# Patient Record
(demographics unavailable — no encounter records)

---

## 2024-10-07 NOTE — DISCUSSION/SUMMARY
[FreeTextEntry1] : 16 year old F with symptoms likely 2/2 viral URI, however exposed to pneumonia/bronchitis in household. PE and vitals are wnl. Rapid Strep negative.   Recommend supportive care including antipyretics, fluids, and humidifier/warm bath, then nasal saline followed by nasal suction. Education provided for signs of respiratory distress and dehydration. Return if symptoms worsen or persist. F/u throat cx Azithromycin x5 days due to exposure

## 2024-10-07 NOTE — HISTORY OF PRESENT ILLNESS
[de-identified] : FEVER, SORE THROAT, COUGH, AND WEAKNESS  [FreeTextEntry6] : 16 yr old F presenting for 4 days of symptoms. Started with a sharp pain in right side of throat. Now with sore throat b/l, pain behind nose, occasional dry cough. No fever, congestion. Hoarse voice, no appetite, feels weak/achy, and more tired than normal. Whole family sick and treated with abx.

## 2024-10-07 NOTE — HISTORY OF PRESENT ILLNESS
[de-identified] : FEVER, SORE THROAT, COUGH, AND WEAKNESS  [FreeTextEntry6] : 16 yr old F presenting for 4 days of symptoms. Started with a sharp pain in right side of throat. Now with sore throat b/l, pain behind nose, occasional dry cough. No fever, congestion. Hoarse voice, no appetite, feels weak/achy, and more tired than normal. Whole family sick and treated with abx.

## 2024-10-07 NOTE — PHYSICAL EXAM
[Cerumen in canal] : cerumen in canal [Bilateral] : (bilateral) [Erythematous Oropharynx] : erythematous oropharynx [Enlarged Tonsils] : enlarged tonsils [NL] : warm, clear [Tired appearing] : not tired appearing [Lethargic] : not lethargic [Vesicles] : no vesicles [Exudate] : no exudate [Wheezing] : no wheezing [Crackles] : no crackles [Transmitted Upper Airway Sounds] : no transmitted upper airway sounds [Tachypnea] : no tachypnea

## 2024-10-16 NOTE — BEGINNING OF VISIT
[Mother] : mother
Attending Attestation (For Attendings USE Only)...

## 2024-10-17 NOTE — REVIEW OF SYSTEMS
[Fever] : no fever [Headache] : headache [Nasal Discharge] : nasal discharge [Nasal Congestion] : nasal congestion [Sore Throat] : sore throat [Tachypnea] : not tachypneic [Wheezing] : no wheezing [Cough] : cough

## 2024-10-17 NOTE — DISCUSSION/SUMMARY
[FreeTextEntry1] :  16 year girl with URI symptoms, likely secondary to viral illness.  Recommend supportive care including antipyretics, fluids, and nasal saline.  Return if symptoms worsen, develop signs of respiratory distress or dehydration

## 2024-10-17 NOTE — HISTORY OF PRESENT ILLNESS
[FreeTextEntry6] :  15 yo female with cough and congestion x 2 days. no fevers. No shortness of breath or diff breathing.  Seen in office 2 weeks ago, diagnosed with atypical pneumonia. s/p Abx. Improved before new symptoms started.  [de-identified] : STOMACH PAIN FROM COUGHING, CHEST PAIN,

## 2024-10-17 NOTE — HISTORY OF PRESENT ILLNESS
[FreeTextEntry6] :  15 yo female with cough and congestion x 2 days. no fevers. No shortness of breath or diff breathing.  Seen in office 2 weeks ago, diagnosed with atypical pneumonia. s/p Abx. Improved before new symptoms started.  [de-identified] : STOMACH PAIN FROM COUGHING, CHEST PAIN,

## 2024-10-17 NOTE — PHYSICAL EXAM
[Acute Distress] : no acute distress [Alert] : alert [EOMI] : grossly EOMI [Conjuctival Injection] : no conjunctival injection [Eyelid Swelling] : no eyelid swelling [Clear] : right tympanic membrane clear [Clear Rhinorrhea] : clear rhinorrhea [Inflamed Nasal Mucosa] : inflamed nasal mucosa [Supple] : supple [NL] : regular rate and rhythm, normal S1, S2 audible, no murmurs [Soft] : soft [Capillary Refill <2s] : capillary refill < 2s [de-identified] : MMM [FreeTextEntry7] : Equal air entry, clear lung sounds b/l, no wheezing, crackles or Tachypnea

## 2024-10-17 NOTE — PHYSICAL EXAM
[Acute Distress] : no acute distress [Alert] : alert [EOMI] : grossly EOMI [Conjuctival Injection] : no conjunctival injection [Eyelid Swelling] : no eyelid swelling [Clear] : right tympanic membrane clear [Clear Rhinorrhea] : clear rhinorrhea [Inflamed Nasal Mucosa] : inflamed nasal mucosa [Supple] : supple [NL] : regular rate and rhythm, normal S1, S2 audible, no murmurs [Soft] : soft [Capillary Refill <2s] : capillary refill < 2s [de-identified] : MMM [FreeTextEntry7] : Equal air entry, clear lung sounds b/l, no wheezing, crackles or Tachypnea

## 2024-10-17 NOTE — HISTORY OF PRESENT ILLNESS
[FreeTextEntry6] :  17 yo female with cough and congestion x 2 days. no fevers. No shortness of breath or diff breathing.  Seen in office 2 weeks ago, diagnosed with atypical pneumonia. s/p Abx. Improved before new symptoms started.  [de-identified] : STOMACH PAIN FROM COUGHING, CHEST PAIN,

## 2024-10-17 NOTE — PHYSICAL EXAM
[Acute Distress] : no acute distress [Alert] : alert [EOMI] : grossly EOMI [Conjuctival Injection] : no conjunctival injection [Eyelid Swelling] : no eyelid swelling [Clear] : right tympanic membrane clear [Clear Rhinorrhea] : clear rhinorrhea [Inflamed Nasal Mucosa] : inflamed nasal mucosa [Supple] : supple [NL] : regular rate and rhythm, normal S1, S2 audible, no murmurs [Soft] : soft [Capillary Refill <2s] : capillary refill < 2s [de-identified] : MMM [FreeTextEntry7] : Equal air entry, clear lung sounds b/l, no wheezing, crackles or Tachypnea

## 2024-12-12 NOTE — PHYSICAL EXAM
[Cerumen in canal] : cerumen in canal [Bilateral] : (bilateral) [Inflamed Nasal Mucosa] : inflamed nasal mucosa [Hypertrophied Nasal Mucosa] : hypertrophied nasal mucosa [NL] : warm, clear

## 2025-01-10 NOTE — DISCUSSION/SUMMARY
[FreeTextEntry1] : CONSITENT WITH MUSCLE STRAIN, TO APPLY WARM COMPRESS, PASSIVE RANGE OF MOTION EXERCISES, ACETAMINOPHEN, CHANGE POSTURAL HABITS, CALL IF NO IMPROVEMENT IN 5 DAYS

## 2025-01-10 NOTE — PHYSICAL EXAM
[Straight] : straight [NL] : warm, clear [de-identified] : TENDERNESS OVER LATISSUMUS DORSI BILATERALY [de-identified] : NO POINT TENDERNESS [de-identified] : CN 2-12 GROSSLY NORMAL, NORMAL STREGNTH AND TONE ALL EXTREMIRIES, NORMAL REFLEXES, NEGATIVE ROMBERG

## 2025-01-10 NOTE — PHYSICAL EXAM
[Straight] : straight [NL] : warm, clear [de-identified] : TENDERNESS OVER LATISSUMUS DORSI BILATERALY [de-identified] : NO POINT TENDERNESS [de-identified] : CN 2-12 GROSSLY NORMAL, NORMAL STREGNTH AND TONE ALL EXTREMIRIES, NORMAL REFLEXES, NEGATIVE ROMBERG

## 2025-01-10 NOTE — HISTORY OF PRESENT ILLNESS
[de-identified] : BACK PAIN  4  DAY HX, NO FEVER, NO COUGH, NO TRAUMA, NO RECENT PHYSICAL ACTIVITY,, BU HUNCHES OVER LARGE AMOUNT OF HOMEWORK. NO C/O OF WEAKNESS OR NUMBNESS OF LOWER EXTREMITIES, NO URINARY OR STOOL INCONTINENCE

## 2025-01-10 NOTE — HISTORY OF PRESENT ILLNESS
[de-identified] : BACK PAIN  4  DAY HX, NO FEVER, NO COUGH, NO TRAUMA, NO RECENT PHYSICAL ACTIVITY,, BU HUNCHES OVER LARGE AMOUNT OF HOMEWORK. NO C/O OF WEAKNESS OR NUMBNESS OF LOWER EXTREMITIES, NO URINARY OR STOOL INCONTINENCE

## 2025-01-13 NOTE — PHYSICAL EXAM
[Normal] : mucosa is normal [Midline] : trachea located in midline position [de-identified] : Bilateral excessive wax  [de-identified] : 2-3+

## 2025-01-13 NOTE — ASSESSMENT
[FreeTextEntry1] : 17y/o female who was referred by her pcp for excessive ear wax bilateral. She also complains of swollen tonsils and snoring   Excessive wax bilateral -Wax removed from bilateral ear canals -Hearing is good -declines audio   Snoring Throat exam: 2-3+ tonsils  -Sleep study ordered  - if negative no need for intervention, if positive will plan for T&A

## 2025-01-13 NOTE — HISTORY OF PRESENT ILLNESS
[de-identified] : 17y/o female who came in to have her ears cleaned. She states her PCP said she has wax build up in both ears.  Pt denies any ear pain, drainage, tinnitus or hearing loss. She states her tonsil are swollen and irritated but she does not get strep infections. She states she does snore. She has no throat discomfort today.  Pt denies any throat pain, change in voice, dysphagia to solids or liquids or globus sensation

## 2025-01-13 NOTE — END OF VISIT
[FreeTextEntry3] : I personally saw and examined Ms. LEONARDO FONSECA in detail this visit today. I personally reviewed the HPI, PMH, FH, SH, ROS and medications/allergies. I have spoken to GEOVANNY Alves regarding the history and have personally determined the assessment and plan of care, and documented this myself. I was present and participated in all key portions of the encounter and all procedures noted above. I have made changes in the body of the note where appropriate.   Attesting Faculty: See Attending Signature Below

## 2025-01-13 NOTE — CONSULT LETTER
[Dear  ___] : Dear  [unfilled], [Consult Letter:] : I had the pleasure of evaluating your patient, [unfilled]. [Please see my note below.] : Please see my note below. [Consult Closing:] : Thank you very much for allowing me to participate in the care of this patient.  If you have any questions, please do not hesitate to contact me. [Sincerely,] : Sincerely, [FreeTextEntry3] : Lorena Chaudhary MD Otolaryngology and Cranial Base Surgery  Attending Physician- Department of Otolaryngology and Head & Neck Surgery  [facility] [facility] Office: [phone]  Fax: [phone]

## 2025-01-13 NOTE — REASON FOR VISIT
[Initial Consultation] : an initial consultation for [Parent] : parent [FreeTextEntry2] : wax build up an swollen tonsils